# Patient Record
Sex: MALE | Race: ASIAN | NOT HISPANIC OR LATINO | Employment: OTHER | URBAN - METROPOLITAN AREA
[De-identification: names, ages, dates, MRNs, and addresses within clinical notes are randomized per-mention and may not be internally consistent; named-entity substitution may affect disease eponyms.]

---

## 2021-10-17 ENCOUNTER — HOSPITAL ENCOUNTER (OUTPATIENT)
Facility: MEDICAL CENTER | Age: 66
End: 2021-10-18
Attending: EMERGENCY MEDICINE | Admitting: STUDENT IN AN ORGANIZED HEALTH CARE EDUCATION/TRAINING PROGRAM
Payer: COMMERCIAL

## 2021-10-17 ENCOUNTER — APPOINTMENT (OUTPATIENT)
Dept: RADIOLOGY | Facility: MEDICAL CENTER | Age: 66
End: 2021-10-17
Attending: EMERGENCY MEDICINE
Payer: COMMERCIAL

## 2021-10-17 DIAGNOSIS — K92.2 GASTROINTESTINAL HEMORRHAGE, UNSPECIFIED GASTROINTESTINAL HEMORRHAGE TYPE: ICD-10-CM

## 2021-10-17 DIAGNOSIS — D62 ACUTE BLOOD LOSS ANEMIA: ICD-10-CM

## 2021-10-17 PROBLEM — E11.9 TYPE 2 DIABETES MELLITUS, WITHOUT LONG-TERM CURRENT USE OF INSULIN (HCC): Status: ACTIVE | Noted: 2021-10-17

## 2021-10-17 PROBLEM — I10 HYPERTENSION: Status: ACTIVE | Noted: 2021-10-17

## 2021-10-17 LAB
ABO + RH BLD: NORMAL
ABO GROUP BLD: NORMAL
ALBUMIN SERPL BCP-MCNC: 4.3 G/DL (ref 3.2–4.9)
ALBUMIN/GLOB SERPL: 1.4 G/DL
ALP SERPL-CCNC: 56 U/L (ref 30–99)
ALT SERPL-CCNC: 24 U/L (ref 2–50)
ANION GAP SERPL CALC-SCNC: 13 MMOL/L (ref 7–16)
APTT PPP: 32.9 SEC (ref 24.7–36)
AST SERPL-CCNC: 16 U/L (ref 12–45)
BASOPHILS # BLD AUTO: 0.7 % (ref 0–1.8)
BASOPHILS # BLD: 0.05 K/UL (ref 0–0.12)
BILIRUB SERPL-MCNC: 0.2 MG/DL (ref 0.1–1.5)
BLD GP AB SCN SERPL QL: NORMAL
BUN SERPL-MCNC: 25 MG/DL (ref 8–22)
CALCIUM SERPL-MCNC: 9.6 MG/DL (ref 8.5–10.5)
CHLORIDE SERPL-SCNC: 101 MMOL/L (ref 96–112)
CO2 SERPL-SCNC: 27 MMOL/L (ref 20–33)
CREAT SERPL-MCNC: 1.03 MG/DL (ref 0.5–1.4)
EOSINOPHIL # BLD AUTO: 0.22 K/UL (ref 0–0.51)
EOSINOPHIL NFR BLD: 3.3 % (ref 0–6.9)
ERYTHROCYTE [DISTWIDTH] IN BLOOD BY AUTOMATED COUNT: 42.7 FL (ref 35.9–50)
EST. AVERAGE GLUCOSE BLD GHB EST-MCNC: 177 MG/DL
GLOBULIN SER CALC-MCNC: 3.1 G/DL (ref 1.9–3.5)
GLUCOSE SERPL-MCNC: 241 MG/DL (ref 65–99)
HBA1C MFR BLD: 7.8 % (ref 4–5.6)
HCT VFR BLD AUTO: 31.6 % (ref 42–52)
HCT VFR BLD AUTO: 43.2 % (ref 42–52)
HGB BLD-MCNC: 10.3 G/DL (ref 14–18)
HGB BLD-MCNC: 12 G/DL (ref 14–18)
HGB BLD-MCNC: 14.1 G/DL (ref 14–18)
IMM GRANULOCYTES # BLD AUTO: 0.01 K/UL (ref 0–0.11)
IMM GRANULOCYTES NFR BLD AUTO: 0.1 % (ref 0–0.9)
INR PPP: 0.99 (ref 0.87–1.13)
LIPASE SERPL-CCNC: 43 U/L (ref 11–82)
LYMPHOCYTES # BLD AUTO: 2.11 K/UL (ref 1–4.8)
LYMPHOCYTES NFR BLD: 31.6 % (ref 22–41)
MCH RBC QN AUTO: 31 PG (ref 27–33)
MCHC RBC AUTO-ENTMCNC: 32.6 G/DL (ref 33.7–35.3)
MCV RBC AUTO: 94.9 FL (ref 81.4–97.8)
MONOCYTES # BLD AUTO: 0.58 K/UL (ref 0–0.85)
MONOCYTES NFR BLD AUTO: 8.7 % (ref 0–13.4)
NEUTROPHILS # BLD AUTO: 3.7 K/UL (ref 1.82–7.42)
NEUTROPHILS NFR BLD: 55.6 % (ref 44–72)
NRBC # BLD AUTO: 0 K/UL
NRBC BLD-RTO: 0 /100 WBC
PLATELET # BLD AUTO: 231 K/UL (ref 164–446)
PMV BLD AUTO: 9.7 FL (ref 9–12.9)
POTASSIUM SERPL-SCNC: 3.7 MMOL/L (ref 3.6–5.5)
PROT SERPL-MCNC: 7.4 G/DL (ref 6–8.2)
PROTHROMBIN TIME: 12.8 SEC (ref 12–14.6)
RBC # BLD AUTO: 4.55 M/UL (ref 4.7–6.1)
RH BLD: NORMAL
SODIUM SERPL-SCNC: 141 MMOL/L (ref 135–145)
WBC # BLD AUTO: 6.7 K/UL (ref 4.8–10.8)

## 2021-10-17 PROCEDURE — A9270 NON-COVERED ITEM OR SERVICE: HCPCS | Performed by: STUDENT IN AN ORGANIZED HEALTH CARE EDUCATION/TRAINING PROGRAM

## 2021-10-17 PROCEDURE — 700102 HCHG RX REV CODE 250 W/ 637 OVERRIDE(OP): Performed by: STUDENT IN AN ORGANIZED HEALTH CARE EDUCATION/TRAINING PROGRAM

## 2021-10-17 PROCEDURE — 85730 THROMBOPLASTIN TIME PARTIAL: CPT

## 2021-10-17 PROCEDURE — 96366 THER/PROPH/DIAG IV INF ADDON: CPT

## 2021-10-17 PROCEDURE — G0378 HOSPITAL OBSERVATION PER HR: HCPCS

## 2021-10-17 PROCEDURE — 96375 TX/PRO/DX INJ NEW DRUG ADDON: CPT

## 2021-10-17 PROCEDURE — 700111 HCHG RX REV CODE 636 W/ 250 OVERRIDE (IP): Performed by: STUDENT IN AN ORGANIZED HEALTH CARE EDUCATION/TRAINING PROGRAM

## 2021-10-17 PROCEDURE — 36415 COLL VENOUS BLD VENIPUNCTURE: CPT

## 2021-10-17 PROCEDURE — 80053 COMPREHEN METABOLIC PANEL: CPT

## 2021-10-17 PROCEDURE — C9113 INJ PANTOPRAZOLE SODIUM, VIA: HCPCS | Performed by: STUDENT IN AN ORGANIZED HEALTH CARE EDUCATION/TRAINING PROGRAM

## 2021-10-17 PROCEDURE — 85014 HEMATOCRIT: CPT

## 2021-10-17 PROCEDURE — 99219 PR INITIAL OBSERVATION CARE,LEVL II: CPT | Performed by: STUDENT IN AN ORGANIZED HEALTH CARE EDUCATION/TRAINING PROGRAM

## 2021-10-17 PROCEDURE — C9113 INJ PANTOPRAZOLE SODIUM, VIA: HCPCS | Performed by: EMERGENCY MEDICINE

## 2021-10-17 PROCEDURE — 700105 HCHG RX REV CODE 258: Performed by: EMERGENCY MEDICINE

## 2021-10-17 PROCEDURE — 86900 BLOOD TYPING SEROLOGIC ABO: CPT

## 2021-10-17 PROCEDURE — 86850 RBC ANTIBODY SCREEN: CPT

## 2021-10-17 PROCEDURE — 85018 HEMOGLOBIN: CPT

## 2021-10-17 PROCEDURE — 96374 THER/PROPH/DIAG INJ IV PUSH: CPT

## 2021-10-17 PROCEDURE — 85610 PROTHROMBIN TIME: CPT

## 2021-10-17 PROCEDURE — 700111 HCHG RX REV CODE 636 W/ 250 OVERRIDE (IP): Performed by: EMERGENCY MEDICINE

## 2021-10-17 PROCEDURE — 85025 COMPLETE CBC W/AUTO DIFF WBC: CPT

## 2021-10-17 PROCEDURE — 71045 X-RAY EXAM CHEST 1 VIEW: CPT

## 2021-10-17 PROCEDURE — 96365 THER/PROPH/DIAG IV INF INIT: CPT

## 2021-10-17 PROCEDURE — 83690 ASSAY OF LIPASE: CPT

## 2021-10-17 PROCEDURE — 86901 BLOOD TYPING SEROLOGIC RH(D): CPT

## 2021-10-17 PROCEDURE — 83036 HEMOGLOBIN GLYCOSYLATED A1C: CPT

## 2021-10-17 PROCEDURE — 99285 EMERGENCY DEPT VISIT HI MDM: CPT

## 2021-10-17 RX ORDER — DEXTROSE MONOHYDRATE 25 G/50ML
50 INJECTION, SOLUTION INTRAVENOUS
Status: DISCONTINUED | OUTPATIENT
Start: 2021-10-17 | End: 2021-10-18 | Stop reason: HOSPADM

## 2021-10-17 RX ORDER — ATORVASTATIN CALCIUM 20 MG/1
20 TABLET, FILM COATED ORAL DAILY
COMMUNITY
Start: 2021-04-02 | End: 2023-04-02

## 2021-10-17 RX ORDER — ONDANSETRON 2 MG/ML
4 INJECTION INTRAMUSCULAR; INTRAVENOUS EVERY 4 HOURS PRN
Status: DISCONTINUED | OUTPATIENT
Start: 2021-10-17 | End: 2021-10-18 | Stop reason: HOSPADM

## 2021-10-17 RX ORDER — HYDRALAZINE HYDROCHLORIDE 20 MG/ML
10 INJECTION INTRAMUSCULAR; INTRAVENOUS EVERY 6 HOURS PRN
Status: DISCONTINUED | OUTPATIENT
Start: 2021-10-17 | End: 2021-10-17

## 2021-10-17 RX ORDER — HYDROCHLOROTHIAZIDE 25 MG/1
25 TABLET ORAL DAILY
COMMUNITY
Start: 2021-09-02

## 2021-10-17 RX ORDER — LISINOPRIL 40 MG/1
40 TABLET ORAL DAILY
COMMUNITY
Start: 2019-09-23

## 2021-10-17 RX ORDER — OMEPRAZOLE 20 MG/1
20 CAPSULE, DELAYED RELEASE ORAL DAILY
Status: DISCONTINUED | OUTPATIENT
Start: 2021-10-17 | End: 2021-10-18 | Stop reason: HOSPADM

## 2021-10-17 RX ORDER — PANTOPRAZOLE SODIUM 40 MG/10ML
40 INJECTION, POWDER, LYOPHILIZED, FOR SOLUTION INTRAVENOUS 2 TIMES DAILY
Status: DISCONTINUED | OUTPATIENT
Start: 2021-10-17 | End: 2021-10-17

## 2021-10-17 RX ORDER — METFORMIN HYDROCHLORIDE 500 MG/1
2 TABLET, EXTENDED RELEASE ORAL 2 TIMES DAILY
COMMUNITY
Start: 2020-05-26

## 2021-10-17 RX ORDER — AMLODIPINE BESYLATE 5 MG/1
5 TABLET ORAL 2 TIMES DAILY
COMMUNITY
Start: 2021-08-19

## 2021-10-17 RX ORDER — GLIPIZIDE 5 MG/1
5-10 TABLET ORAL 2 TIMES DAILY
COMMUNITY
Start: 2020-12-21

## 2021-10-17 RX ADMIN — PANTOPRAZOLE SODIUM 40 MG: 40 INJECTION, POWDER, LYOPHILIZED, FOR SOLUTION INTRAVENOUS at 06:15

## 2021-10-17 RX ADMIN — OMEPRAZOLE 20 MG: 20 CAPSULE, DELAYED RELEASE ORAL at 10:49

## 2021-10-17 RX ADMIN — PANTOPRAZOLE SODIUM 80 MG: 40 INJECTION, POWDER, FOR SOLUTION INTRAVENOUS at 06:00

## 2021-10-17 ASSESSMENT — ENCOUNTER SYMPTOMS
DIZZINESS: 0
TINGLING: 0
SINUS PAIN: 0
ABDOMINAL PAIN: 0
DEPRESSION: 0
NECK PAIN: 0
BLOOD IN STOOL: 1
NERVOUS/ANXIOUS: 0
VOMITING: 0
BACK PAIN: 0
MYALGIAS: 0
CONSTIPATION: 0
PALPITATIONS: 0
FLANK PAIN: 0
NAUSEA: 0
CHILLS: 0
ORTHOPNEA: 0
COUGH: 0
DOUBLE VISION: 0
HEADACHES: 0
SHORTNESS OF BREATH: 0
DIARRHEA: 1
BRUISES/BLEEDS EASILY: 0
HEARTBURN: 0
PHOTOPHOBIA: 0
FEVER: 0
LOSS OF CONSCIOUSNESS: 0
BLURRED VISION: 0
HEMOPTYSIS: 0
EYE PAIN: 0
SORE THROAT: 0

## 2021-10-17 ASSESSMENT — COGNITIVE AND FUNCTIONAL STATUS - GENERAL
SUGGESTED CMS G CODE MODIFIER MOBILITY: CH
SUGGESTED CMS G CODE MODIFIER DAILY ACTIVITY: CH
MOBILITY SCORE: 24
DAILY ACTIVITIY SCORE: 24

## 2021-10-17 ASSESSMENT — LIFESTYLE VARIABLES
CONSUMPTION TOTAL: POSITIVE
ALCOHOL_USE: YES
EVER HAD A DRINK FIRST THING IN THE MORNING TO STEADY YOUR NERVES TO GET RID OF A HANGOVER: NO
DOES PATIENT WANT TO STOP DRINKING: YES
TOTAL SCORE: 2
ON A TYPICAL DAY WHEN YOU DRINK ALCOHOL HOW MANY DRINKS DO YOU HAVE: 2
SUBSTANCE_ABUSE: 0
DOES PATIENT WANT TO TALK TO SOMEONE ABOUT QUITTING: NO
TOTAL SCORE: 2
EVER FELT BAD OR GUILTY ABOUT YOUR DRINKING: YES
AVERAGE NUMBER OF DAYS PER WEEK YOU HAVE A DRINK CONTAINING ALCOHOL: 7
TOTAL SCORE: 2
HAVE PEOPLE ANNOYED YOU BY CRITICIZING YOUR DRINKING: NO
HOW MANY TIMES IN THE PAST YEAR HAVE YOU HAD 5 OR MORE DRINKS IN A DAY: 0
HAVE YOU EVER FELT YOU SHOULD CUT DOWN ON YOUR DRINKING: YES

## 2021-10-17 ASSESSMENT — PATIENT HEALTH QUESTIONNAIRE - PHQ9
SUM OF ALL RESPONSES TO PHQ9 QUESTIONS 1 AND 2: 0
1. LITTLE INTEREST OR PLEASURE IN DOING THINGS: NOT AT ALL
2. FEELING DOWN, DEPRESSED, IRRITABLE, OR HOPELESS: NOT AT ALL

## 2021-10-17 NOTE — ASSESSMENT & PLAN NOTE
Holding antihypertensives in setting of ABLA from lower GIB  Restart as outpatient if hemodynamically stable  No indication for strict BP control, discontinued PRN antihypertensives

## 2021-10-17 NOTE — CARE PLAN
Problem: Knowledge Deficit - Standard  Goal: Patient and family/care givers will demonstrate understanding of plan of care, disease process/condition, diagnostic tests and medications  Outcome: Progressing     The patient is Stable - Low risk of patient condition declining or worsening    Shift Goals  Clinical Goals: Monitor H&H Q 8 hours    Progress made toward(s) clinical / shift goals:  Last hemoglobin was 12. Next lab draw is scheduled for 1500.

## 2021-10-17 NOTE — H&P
Hospital Medicine History & Physical Note    Date of Service  10/17/2021    Primary Care Physician  No primary care provider on file.    Consultants  none     Specialist Names: none     Code Status  Full Code    Chief Complaint  Chief Complaint   Patient presents with   • Bloody Stools     c/o bloody stool started around 1 am. described as bright red blood. no abdominal pain. denied N/V. states that he had Hx of bloody stool.        History of Presenting Illness  Edgar Bonds is a 66 y.o. male with past medical history of DM, HTN, prior GI bleed who presented 10/17/2021 with blood per rectum that started at 1 am today. He reports casimiro bright red blood with some clots He reports having 2 more episodes prior to EMS arrival. He denies any NSAID abuse. He does report having a gi bleed in 2020 and had a colonoscopy done in [place], [place]. He was advised to stop taking baby aspirin after this, however he recently re started 2 weeks ago. He denies any nausea or vomiting. He denies any chest pain, shortness of breath, chills, or fever. He is vaccinated for covid 19.     I discussed the plan of care with patient.    Review of Systems  Review of Systems   Constitutional: Negative for chills and fever.   HENT: Negative for hearing loss and tinnitus.    Eyes: Negative for blurred vision, double vision and photophobia.   Respiratory: Negative for cough and hemoptysis.    Cardiovascular: Negative for chest pain, palpitations and orthopnea.   Gastrointestinal: Positive for blood in stool. Negative for heartburn, nausea and vomiting.   Genitourinary: Negative for dysuria, frequency and urgency.   Musculoskeletal: Negative for back pain, myalgias and neck pain.   Skin: Negative for itching and rash.   Neurological: Negative for dizziness, tingling and headaches.   Endo/Heme/Allergies: Does not bruise/bleed easily.   Psychiatric/Behavioral: Negative for depression, substance abuse and suicidal ideas.       Past Medical  History   has a past medical history of Diabetes (HCC) and Hypertension.    Surgical History  Reviewed and not pertinent     Family History  Reviewed and not pertinent     Family history reviewed with patient. There is no family history that is pertinent to the chief complaint.     Social History   reports that he has never smoked. He has never used smokeless tobacco. He reports current alcohol use. He reports that he does not use drugs.    Allergies  No Known Allergies    Medications  None       Physical Exam  Temp:  [36.2 °C (97.2 °F)] 36.2 °C (97.2 °F)  Pulse:  [91] 91  Resp:  [18] 18  BP: (149)/(78) 149/78  SpO2:  [95 %] 95 %  Blood Pressure : 149/78   Temperature: 36.2 °C (97.2 °F)   Pulse: 91   Respiration: 18   Pulse Oximetry: 95 %       Physical Exam  Vitals and nursing note reviewed.   Constitutional:       General: He is not in acute distress.     Appearance: Normal appearance. He is not ill-appearing.   HENT:      Head: Normocephalic and atraumatic.      Right Ear: Tympanic membrane normal.      Left Ear: Tympanic membrane normal.      Nose: Nose normal.      Mouth/Throat:      Mouth: Mucous membranes are moist.      Pharynx: Oropharynx is clear.   Eyes:      General:         Right eye: No discharge.         Left eye: No discharge.      Extraocular Movements: Extraocular movements intact.      Pupils: Pupils are equal, round, and reactive to light.   Cardiovascular:      Rate and Rhythm: Normal rate and regular rhythm.      Pulses: Normal pulses.      Heart sounds: Normal heart sounds. No murmur heard.   No friction rub.   Pulmonary:      Effort: Pulmonary effort is normal. No respiratory distress.      Breath sounds: Normal breath sounds. No stridor. No wheezing or rhonchi.   Abdominal:      General: Bowel sounds are normal. There is no distension.      Palpations: Abdomen is soft. There is no mass.      Tenderness: There is no abdominal tenderness.      Hernia: No hernia is present.   Musculoskeletal:          General: No swelling, tenderness, deformity or signs of injury. Normal range of motion.      Cervical back: Neck supple. No rigidity or tenderness.   Skin:     General: Skin is warm.      Capillary Refill: Capillary refill takes less than 2 seconds.      Coloration: Skin is not jaundiced or pale.      Findings: No bruising or erythema.   Neurological:      General: No focal deficit present.      Mental Status: He is alert and oriented to person, place, and time. Mental status is at baseline.      Cranial Nerves: No cranial nerve deficit.      Sensory: No sensory deficit.      Motor: No weakness.      Coordination: Coordination normal.   Psychiatric:         Mood and Affect: Mood normal.         Behavior: Behavior normal.         Laboratory:  Recent Labs     10/17/21  0242   WBC 6.7   RBC 4.55*   HEMOGLOBIN 14.1   HEMATOCRIT 43.2   MCV 94.9   MCH 31.0   MCHC 32.6*   RDW 42.7   PLATELETCT 231   MPV 9.7     Recent Labs     10/17/21  0242   SODIUM 141   POTASSIUM 3.7   CHLORIDE 101   CO2 27   GLUCOSE 241*   BUN 25*   CREATININE 1.03   CALCIUM 9.6     Recent Labs     10/17/21  0242   ALTSGPT 24   ASTSGOT 16   ALKPHOSPHAT 56   TBILIRUBIN 0.2   LIPASE 43   GLUCOSE 241*     Recent Labs     10/17/21  0242   APTT 32.9   INR 0.99       Imaging:  DX-CHEST-PORTABLE (1 VIEW)   Final Result      1.  No acute cardiopulmonary abnormality.   2.  Mild cardiac silhouette enlargement.            X-Ray:  I have personally reviewed the images and compared with prior images.    Assessment/Plan:  I anticipate this patient is appropriate for observation status at this time.    * GI bleed  Assessment & Plan  NPO   Serial abdominal exams   IV PPI BID   Monitor H/H  COD  Transfuse < 7   Gi consultation in am - patient is hemodynamically stable at ths time    Hypertension  Assessment & Plan  IV hydralazine 10 mg every 6 hours prn       Type 2 diabetes mellitus, without long-term current use of insulin (HCC)  Assessment & Plan  ISS+frequent  accu-checks+hypoglycemia protocol       VTE prophylaxis: pharmacologic prophylaxis contraindicated due to gi bleed

## 2021-10-17 NOTE — ED TRIAGE NOTES
Edgar Bonds  66 y.o.  male  Chief Complaint   Patient presents with   • Bloody Stools     c/o bloody stool started around 1 am. described as bright red blood. no abdominal pain. denied N/V. states that he had Hx of bloody stool.

## 2021-10-17 NOTE — ED NOTES
Report received, assuming care.  Pt ambulatory to the restroom and back.  Steady gait.  Reports bright red blood per rectum every hour.  Reports mild abdominal discomfort.  BP stable.  When pt falls asleep sats decreased to 80%.  Reports hx of sleep apnea.  Placed on O2 2l via NC.

## 2021-10-17 NOTE — ASSESSMENT & PLAN NOTE
Improving today, stool is becoming more brown and formed  Recurrent LGIB, previously 9/2014 had diverticular bleed (colonoscopy report in Care Everywhere)  Brisk painless BRBPR likely diverticular  GI consult deferred in absence of hemodynamic instability and with self-limiting BRBPR  Presented with normal Hgb but now developed ABLA  T&S completed  Consult GI if ongoing BRBPR and ABLA tomorrow  Follow up with [place] [facility] after discharge  IV protonix transitioned to PO omeprazole (limited efficacy for lower GIB)

## 2021-10-17 NOTE — PROGRESS NOTES
Received report from ER nurse, Sierra.  Patient arrived to floor at 1345.  Assessment complete and POC discussed.   Patient is A&Ox4, VSS, on RA.   Patient denies pain, no apparent signs of distress or discomfort.   Patient is on a clear liquid diet; gave patient water and broth.  Admit profile and 4 eyes skin assessment is complete.  Bed is in lowest/locked position.   Call light and belongings are within reach.   No further needs at this time.

## 2021-10-17 NOTE — PROGRESS NOTES
4 Eyes Skin Assessment Completed by Monik, AYDEE and AYDEE Johnson.    Head WDL  Ears WDL  Nose WDL  Mouth WDL  Neck WDL  Breast/Chest WDL  Shoulder Blades WDL  Spine WDL  (R) Arm/Elbow/Hand WDL  (L) Arm/Elbow/Hand WDL  Abdomen WDL  Groin WDL  Scrotum/Coccyx/Buttocks WDL  (R) Leg WDL  (L) Leg WDL  (R) Heel/Foot/Toe WDL, Dry  (L) Heel/Foot/Toe WDL, Dry          Devices In Places N/A      Interventions In Place N/A    Possible Skin Injury No    Pictures Uploaded Into Epic N/A  Wound Consult Placed N/A  RN Wound Prevention Protocol Ordered No

## 2021-10-17 NOTE — ASSESSMENT & PLAN NOTE
A1c 7.8  Metformin and glipizide held on admission  No indication for strict BG control, discontinued SSI + POCT

## 2021-10-17 NOTE — ED PROVIDER NOTES
"ED Provider Note    CHIEF COMPLAINT  Chief Complaint   Patient presents with   • Bloody Stools     c/o bloody stool started around 1 am. described as bright red blood. no abdominal pain. denied N/V. states that he had Hx of bloody stool.        HPI  Edgar Bonds is a 66 y.o. male who presents to the emergency room with bloody stool.  Patient has a history of GI bleeding evaluated in [place] about a year ago.  He has been fine since his hospital admission for this.  Reports he had a normal colonoscopy and because of bleeding was never identified.  He was hospitalized for 6 days.  At about 1 AM he developed a bright red bloody bowel movement.  Felt a bit bloated.  He has had 2 separate episodes including an episode of bloody stool here in the ER.  He denies any fevers or significant abdominal pain, again has been bloated.  He denies NSAIDs, but just started taking aspirin again about 2 weeks ago.  He takes 81 mg nightly.  No other easy bruising or bleeding.  No dizziness or lightheadedness.    REVIEW OF SYSTEMS  As per HPI, otherwise a 10 point review of systems is negative    PAST MEDICAL HISTORY  Past Medical History:   Diagnosis Date   • Diabetes (HCC)    • Hypertension        Family history  No pertinent family medical history    SOCIAL HISTORY  Social History     Tobacco Use   • Smoking status: Never Smoker   • Smokeless tobacco: Never Used   Vaping Use   • Vaping Use: Never used   Substance Use Topics   • Alcohol use: Yes     Comment: daily   • Drug use: Never       SURGICAL HISTORY  History reviewed. No pertinent surgical history.    CURRENT MEDICATIONS  Home Medications    **Home medications have not yet been reviewed for this encounter**         ALLERGIES  No Known Allergies    PHYSICAL EXAM  VITAL SIGNS: /78   Pulse 91   Temp 36.2 °C (97.2 °F) (Temporal)   Resp 18   Ht 1.676 m (5' 6\")   Wt 102 kg (225 lb 12 oz)   SpO2 95%   BMI 36.44 kg/m²    Constitutional: Awake and alert  HENT: " Normal inspection  Eyes: Normal inspection  Neck: Grossly normal range of motion.  Cardiovascular: Normal heart rate, Normal rhythm.  Symmetric peripheral pulses.   Thorax & Lungs: No respiratory distress, No wheezing, No rales, No rhonchi, No chest tenderness.   Abdomen: Bowel sounds normal, soft, non-distended, nontender, no mass  rectal exam: Skin tags from previous hemorrhoids.  No active anal bleeding or thrombosed hemorrhoid.  There is maroonish colored blood on SVITLANA  Skin: No obvious rash.  Extremities: No clubbing, cyanosis, edema, no Homans or cords.  Neurologic: Grossly normal   Psychiatric: Normal for situation    RADIOLOGY/PROCEDURES  DX-CHEST-PORTABLE (1 VIEW)   Final Result      1.  No acute cardiopulmonary abnormality.   2.  Mild cardiac silhouette enlargement.           Imaging is interpreted by radiologist    Labs:  Results for orders placed or performed during the hospital encounter of 10/17/21   COD (ADULT)   Result Value Ref Range    ABO Grouping Only B     Rh Grouping Only POS     Antibody Screen-Cod NEG    CBC WITH DIFFERENTIAL   Result Value Ref Range    WBC 6.7 4.8 - 10.8 K/uL    RBC 4.55 (L) 4.70 - 6.10 M/uL    Hemoglobin 14.1 14.0 - 18.0 g/dL    Hematocrit 43.2 42.0 - 52.0 %    MCV 94.9 81.4 - 97.8 fL    MCH 31.0 27.0 - 33.0 pg    MCHC 32.6 (L) 33.7 - 35.3 g/dL    RDW 42.7 35.9 - 50.0 fL    Platelet Count 231 164 - 446 K/uL    MPV 9.7 9.0 - 12.9 fL    Neutrophils-Polys 55.60 44.00 - 72.00 %    Lymphocytes 31.60 22.00 - 41.00 %    Monocytes 8.70 0.00 - 13.40 %    Eosinophils 3.30 0.00 - 6.90 %    Basophils 0.70 0.00 - 1.80 %    Immature Granulocytes 0.10 0.00 - 0.90 %    Nucleated RBC 0.00 /100 WBC    Neutrophils (Absolute) 3.70 1.82 - 7.42 K/uL    Lymphs (Absolute) 2.11 1.00 - 4.80 K/uL    Monos (Absolute) 0.58 0.00 - 0.85 K/uL    Eos (Absolute) 0.22 0.00 - 0.51 K/uL    Baso (Absolute) 0.05 0.00 - 0.12 K/uL    Immature Granulocytes (abs) 0.01 0.00 - 0.11 K/uL    NRBC (Absolute) 0.00 K/uL    COMP METABOLIC PANEL   Result Value Ref Range    Sodium 141 135 - 145 mmol/L    Potassium 3.7 3.6 - 5.5 mmol/L    Chloride 101 96 - 112 mmol/L    Co2 27 20 - 33 mmol/L    Anion Gap 13.0 7.0 - 16.0    Glucose 241 (H) 65 - 99 mg/dL    Bun 25 (H) 8 - 22 mg/dL    Creatinine 1.03 0.50 - 1.40 mg/dL    Calcium 9.6 8.5 - 10.5 mg/dL    AST(SGOT) 16 12 - 45 U/L    ALT(SGPT) 24 2 - 50 U/L    Alkaline Phosphatase 56 30 - 99 U/L    Total Bilirubin 0.2 0.1 - 1.5 mg/dL    Albumin 4.3 3.2 - 4.9 g/dL    Total Protein 7.4 6.0 - 8.2 g/dL    Globulin 3.1 1.9 - 3.5 g/dL    A-G Ratio 1.4 g/dL   LIPASE   Result Value Ref Range    Lipase 43 11 - 82 U/L   PROTHROMBIN TIME   Result Value Ref Range    PT 12.8 12.0 - 14.6 sec    INR 0.99 0.87 - 1.13   APTT   Result Value Ref Range    APTT 32.9 24.7 - 36.0 sec   ESTIMATED GFR   Result Value Ref Range    GFR If African American >60 >60 mL/min/1.73 m 2    GFR If Non African American >60 >60 mL/min/1.73 m 2       Medications   pantoprazole (PROTONIX) 80 mg in  mL IVPB (has no administration in time range)   pantoprazole (PROTONIX) injection 40 mg (has no administration in time range)       COURSE & MEDICAL DECISION MAKING  Patient presents to the ER with GI bleeding.  He has dark maroon blood on digital rectal exam.  His vital signs are stable.  Hemoglobin is within normal limits.  He does have an elevated BUN.  I have concern for possibility of upper bleeding as cause given he does report he had a previous negative colonoscopy.  I ordered 80 mg of Protonix IV.  No clinical suggestion of varices.  Patient will be admitted to the hospital.  GI will be consulted during regular hours.    FINAL IMPRESSION  1.  Gastrointestinal hemorrhage    This dictation was created using voice recognition software. The accuracy of the dictation is limited to the abilities of the software.  The nursing notes were reviewed and certain aspects of this information were incorporated into this  note.      Electronically signed by: Tigre Main M.D., 10/17/2021 6:07 AM

## 2021-10-17 NOTE — ED NOTES
Patient ambulated from [facility] to [facility] independently with steady gait accompanied by family.    Chart up for ERP

## 2021-10-18 VITALS
OXYGEN SATURATION: 93 % | HEART RATE: 81 BPM | HEIGHT: 66 IN | BODY MASS INDEX: 36.28 KG/M2 | RESPIRATION RATE: 18 BRPM | WEIGHT: 225.75 LBS | DIASTOLIC BLOOD PRESSURE: 68 MMHG | TEMPERATURE: 97.1 F | SYSTOLIC BLOOD PRESSURE: 123 MMHG

## 2021-10-18 LAB
HCT VFR BLD AUTO: 29.9 % (ref 42–52)
HCT VFR BLD AUTO: 30.3 % (ref 42–52)
HGB BLD-MCNC: 9.7 G/DL (ref 14–18)
HGB BLD-MCNC: 9.8 G/DL (ref 14–18)

## 2021-10-18 PROCEDURE — 85018 HEMOGLOBIN: CPT | Mod: 91

## 2021-10-18 PROCEDURE — A9270 NON-COVERED ITEM OR SERVICE: HCPCS | Performed by: STUDENT IN AN ORGANIZED HEALTH CARE EDUCATION/TRAINING PROGRAM

## 2021-10-18 PROCEDURE — 700102 HCHG RX REV CODE 250 W/ 637 OVERRIDE(OP): Performed by: STUDENT IN AN ORGANIZED HEALTH CARE EDUCATION/TRAINING PROGRAM

## 2021-10-18 PROCEDURE — 85014 HEMATOCRIT: CPT | Mod: 91

## 2021-10-18 PROCEDURE — 99217 PR OBSERVATION CARE DISCHARGE: CPT | Performed by: INTERNAL MEDICINE

## 2021-10-18 PROCEDURE — G0378 HOSPITAL OBSERVATION PER HR: HCPCS

## 2021-10-18 PROCEDURE — 36415 COLL VENOUS BLD VENIPUNCTURE: CPT

## 2021-10-18 RX ADMIN — OMEPRAZOLE 20 MG: 20 CAPSULE, DELAYED RELEASE ORAL at 04:42

## 2021-10-18 NOTE — DISCHARGE SUMMARY
Discharge Summary    CHIEF COMPLAINT ON ADMISSION  Chief Complaint   Patient presents with   • Bloody Stools     c/o bloody stool started around 1 am. described as bright red blood. no abdominal pain. denied N/V. states that he had Hx of bloody stool.        Reason for Admission  Blood in Stool     Admission Date  10/17/2021    CODE STATUS  Full Code    HPI & HOSPITAL COURSE  Edgar Bonds is a 66 y.o. male with past medical history of DM, HTN, prior GI bleed who presented 10/17/2021 with blood per rectum that started at 1 am 10/17. He reports casimiro bright red blood with some clots. He reports having 2 more episodes prior to EMS arrival. He denies any NSAID abuse. He does report having a gi bleed in 2020 and had a colonoscopy done in [place], [place]. He was advised to stop taking baby aspirin after this, however he recently re started 2 weeks ago. He denies abdominal pain, or rectal pain. Hb initially dropped from 14 to 10.3 but remained stable for the next 12 hours. Patient reported resolution of bright red blood per rectum. On day of discharge patient reports formed stool, brown in color, no blood. Patient tolerated diet without complication. Referrals placed for outpatient follow up with GI and establish with PCP. Patient was advised to return to hospital if BRBPR reoccurs. Patient was determined satisfactory for discharge with appropriate follow up.     Therefore, he is discharged in fair and stable condition to home with close outpatient follow-up.    The patient recovered much more quickly than anticipated on admission.    Discharge Date  10/18/2021    FOLLOW UP ITEMS POST DISCHARGE  Please follow up with PCP in 3-5 days for post hospitalization follow up and medication reconciliation.       DISCHARGE DIAGNOSES  Principal Problem:    Lower GI bleed POA: Yes  Active Problems:    Type 2 diabetes mellitus, without long-term current use of insulin (HCC) POA: Yes    Primary hypertension POA: Yes     Acute blood loss anemia POA: Yes  Resolved Problems:    * No resolved hospital problems. *      FOLLOW UP  No future appointments.  No follow-up provider specified.    MEDICATIONS ON DISCHARGE     Medication List      CONTINUE taking these medications      Instructions   amLODIPine 5 MG Tabs  Commonly known as: NORVASC   Take 5 mg by mouth 2 times a day.  Dose: 5 mg     atorvastatin 20 MG Tabs  Commonly known as: LIPITOR   Take 20 mg by mouth every day.  Dose: 20 mg     glipiZIDE 5 MG Tabs  Commonly known as: GLUCOTROL   Take 5-10 mg by mouth 2 times a day. 5AM & 10PM  Dose: 5-10 mg     hydroCHLOROthiazide 25 MG Tabs  Commonly known as: HYDRODIURIL   Take 25 mg by mouth every day.  Dose: 25 mg     lisinopril 40 MG tablet  Commonly known as: PRINIVIL   Take 40 mg by mouth every day.  Dose: 40 mg     metFORMIN  MG Tb24  Commonly known as: GLUCOPHAGE XR   Take 2 Tablets by mouth 2 times a day.  Dose: 2 Tablet            Allergies  No Known Allergies    DIET  Orders Placed This Encounter   Procedures   • Diet Order Diet: Low Fiber(GI Soft)     Standing Status:   Standing     Number of Occurrences:   1     Order Specific Question:   Diet:     Answer:   Low Fiber(GI Soft) [2]       ACTIVITY  As tolerated.  Weight bearing as tolerated    CONSULTATIONS  N/A    PROCEDURES  N/A    LABORATORY  Lab Results   Component Value Date    SODIUM 141 10/17/2021    POTASSIUM 3.7 10/17/2021    CHLORIDE 101 10/17/2021    CO2 27 10/17/2021    GLUCOSE 241 (H) 10/17/2021    BUN 25 (H) 10/17/2021    CREATININE 1.03 10/17/2021        Lab Results   Component Value Date    WBC 6.7 10/17/2021    HEMOGLOBIN 9.8 (L) 10/18/2021    HEMATOCRIT 29.9 (L) 10/18/2021    PLATELETCT 231 10/17/2021        Total time of the discharge process exceeds 41 minutes.

## 2021-10-18 NOTE — DISCHARGE INSTRUCTIONS
Discharge Instructions    Discharged to home by car with relative. Discharged via walking, hospital escort: Refused.  Special equipment needed: Not Applicable    Be sure to schedule a follow-up appointment with your primary care doctor or any specialists as instructed.     Discharge Plan:   Diet Plan: Discussed  Activity Level: Discussed  Confirmed Follow up Appointment: No (Comments)  Confirmed Symptoms Management: Discussed  Medication Reconciliation Updated: Yes  Influenza Vaccine Indication: Not indicated: Previously immunized this influenza season and > 8 years of age    I understand that a diet low in cholesterol, fat, and sodium is recommended for good health. Unless I have been given specific instructions below for another diet, I accept this instruction as my diet prescription.   Other diet: n/a    Special Instructions: None    · Is patient discharged on Warfarin / Coumadin?   No         Gastrointestinal Bleeding  Gastrointestinal (GI) bleeding is bleeding somewhere along the path that food travels through the body (digestive tract). This path is anywhere between the mouth and the opening of the butt (anus). You may have blood in your poop (stool) or have black poop. If you throw up (vomit), there may be blood in it.  This condition can be mild, serious, or even life-threatening. If you have a lot of bleeding, you may need to stay in the hospital.  What are the causes?  This condition may be caused by:  · Irritation and swelling of the esophagus (esophagitis). The esophagus is part of the body that moves food from your mouth to your stomach.  · Swollen veins in the butt (hemorrhoids).  · Areas of painful tearing in the opening of the butt (anal fissures). These are often caused by passing hard poop.  · Pouches that form on the colon over time (diverticulosis).  · Irritation and swelling (diverticulitis) in areas where pouches have formed on the colon.  · Growths (polyps) or cancer. Colon cancer often starts  out as growths that are not cancer.  · Irritation of the stomach lining (gastritis).  · Sores (ulcers) in the stomach.  What increases the risk?  You are more likely to develop this condition if you:  · Have a certain type of infection in your stomach (Helicobacter pylori infection).  · Take certain medicines.  · Smoke.  · Drink alcohol.  What are the signs or symptoms?  Common symptoms of this condition include:  · Throwing up (vomiting) material that has bright red blood in it. It may look like coffee grounds.  · Changes in your poop. The poop may:  ? Have red blood in it.  ? Be black, look like tar, and smell stronger than normal.  ? Be red.  · Pain or cramping in the belly (abdomen).  How is this treated?  Treatment for this condition depends on the cause of the bleeding. For example:  · Sometimes, the bleeding can be stopped during a procedure that is done to find the problem (endoscopy or colonoscopy).  · Medicines can be used to:  ? Help control irritation, swelling, or infection.  ? Reduce acid in your stomach.  · Certain problems can be treated with:  ? Creams.  ? Medicines that are put in the butt (suppositories).  ? Warm baths.  · Surgery is sometimes needed.  · If you lose a lot of blood, you may need a blood transfusion.  If bleeding is mild, you may be allowed to go home. If there is a lot of bleeding, you will need to stay in the hospital.  Follow these instructions at home:    · Take over-the-counter and prescription medicines only as told by your doctor.  · Eat foods that have a lot of fiber in them. These foods include beans, whole grains, and fresh fruits and vegetables. You can also try eating 1-3 prunes each day.  · Drink enough fluid to keep your pee (urine) pale yellow.  · Keep all follow-up visits as told by your doctor. This is important.  Contact a doctor if:  · Your symptoms do not get better.  Get help right away if:  · Your bleeding does not stop.  · You feel dizzy or you pass out  (faint).  · You feel weak.  · You have very bad cramps in your back or belly.  · You pass large clumps of blood (clots) in your poop.  · Your symptoms are getting worse.  · You have chest pain or fast heartbeats.  Summary  · GI bleeding is bleeding somewhere along the path that food travels through the body (digestive tract).  · This bleeding can be caused by many things. Treatment depends on the cause of the bleeding.  · Take medicines only as told by your doctor.  · Keep all follow-up visits as told by your doctor. This is important.  This information is not intended to replace advice given to you by your health care provider. Make sure you discuss any questions you have with your health care provider.  Document Released: 09/26/2009 Document Revised: 07/31/2019 Document Reviewed: 07/31/2019  [employer] Patient Education © 2020 [employer]            Depression / Suicide Risk    As you are discharged from this [facility], it is important to learn how to keep safe from harming yourself.    Recognize the warning signs:  · Abrupt changes in personality, positive or negative- including increase in energy   · Giving away possessions  · Change in eating patterns- significant weight changes-  positive or negative  · Change in sleeping patterns- unable to sleep or sleeping all the time   · Unwillingness or inability to communicate  · Depression  · Unusual sadness, discouragement and loneliness  · Talk of wanting to die  · Neglect of personal appearance   · Rebelliousness- reckless behavior  · Withdrawal from people/activities they love  · Confusion- inability to concentrate     If you or a loved one observes any of these behaviors or has concerns about self-harm, here's what you can do:  · Talk about it- your feelings and reasons for harming yourself  · Remove any means that you might use to hurt yourself (examples: pills, rope, extension cords, firearm)  · Get professional help from the community (Mental  Health, Substance Abuse, psychological counseling)  · Do not be alone:Call your Safe Contact- someone whom you trust who will be there for you.  · Call your local CRISIS HOTLINE 368-6698 or 140-483-2780  · Call your local Children's Mobile Crisis Response Team [place] (851) 925-0896 or www.Lagiar  · Call the toll free National Suicide Prevention Hotlines   · National Suicide Prevention Lifeline 116-536-SZOS (5843)  · National Hope Line Network 800-SUICIDE (714-8441)

## 2021-10-18 NOTE — CARE PLAN
The patient is Stable - Low risk of patient condition declining or worsening    Shift Goals  Clinical Goals: maintain Hgb >7  Patient Goals: to have formed stool    Progress made toward(s) clinical / shift goals:  pts hgb trending down however most recent >9    Patient is not progressing towards the following goals: pt continues to have loose stool

## 2021-10-18 NOTE — PROGRESS NOTES
*COVID-19 surge in effect*    Received bedside report at change of shift. Pt is resting in bed no signs of distress. VSS on RA. Discussed POC for the day and pt states no pain or other needs at this time. Bed is locked and in lowest position with water and call light in reach.

## 2021-10-18 NOTE — PROGRESS NOTES
Pt dc'd home with family. IV removed.  Pt left unit via walking with family. Personal belongings with pt when leaving unit. Pt given discharge instructions prior to leaving unit including where to  prescriptions and when to follow-up; verbalizes understanding. Copy of discharge instructions with pt and in the chart. UC notified at time of departure

## 2021-10-18 NOTE — DISCHARGE PLANNING
Anticipated Discharge Disposition: Home ([place], [place])    Action: Pt discussed during IDT rounds. Per Dr. Eckert pt to potentially d/c today. Pt has a six clicks score of 24.     LSW met with pt at bedside. Pt stated that his spouse or niece are available to provide transportation back home in [place], [place] upon d/c.     Barriers to Discharge: None     Plan: LSW to assist as needed       Care Transition Team Assessment    Information Source  Information Given By: Other (Comments)  Who is responsible for making decisions for patient? : Patient    Elopement Risk  Legal Hold: No  Ambulatory or Self Mobile in Wheelchair: Yes  Disoriented: No  Psychiatric Symptoms: None  History of Wandering: No  Elopement this Admit: No  Vocalizing Wanting to Leave: No  Displays Behaviors, Body Language Wanting to Leave: No-Not at Risk for Elopement  Elopement Risk: Not at Risk for Elopement    Interdisciplinary Discharge Planning  Lives with - Patient's Self Care Capacity: Spouse, Child Less than 18 Years of Age  Patient or legal guardian wants to designate a caregiver: No  Support Systems: Children, Friends / Neighbors, Family Member(s)  Housing / Facility: [address]  Durable Medical Equipment: Not Applicable    Discharge Preparedness  What is your plan after discharge?: Home with help  What are your discharge supports?: Spouse, Other (comment)  Prior Functional Level: Independent with Activities of Daily Living, Independent with Medication Management  Difficulity with ADLs: None  Difficulity with IADLs: None    Functional Assesment  Prior Functional Level: Independent with Activities of Daily Living, Independent with Medication Management    Finances  Financial Barriers to Discharge: No  Prescription Coverage: Yes    Vision / Hearing Impairment  Vision Impairment : Yes  Right Eye Vision: Impaired, Wears Glasses  Left Eye Vision: Impaired, Wears Glasses  Hearing Impairment : No    Domestic Abuse  Have you ever been the victim of  abuse or violence?: No  Physical Abuse or Sexual Abuse: No  Verbal Abuse or Emotional Abuse: No  Possible Abuse/Neglect Reported to:: Not Applicable    Psychological Assessment  History of Substance Abuse: None  History of Psychiatric Problems: No    Discharge Risks or Barriers  Discharge risks or barriers?: No  Patient risk factors: Vulnerable adult    Anticipated Discharge Information  Discharge Disposition: Discharged to home/self care (01)

## 2021-10-18 NOTE — PROGRESS NOTES
covid surge in effect    States his stools are decreasing with less blood; continue to monitor H/H q8hrs    Stated bm x1 early evening but nothing overnight

## 2021-10-18 NOTE — CARE PLAN
The patient is Stable - Low risk of patient condition declining or worsening    Shift Goals  Clinical Goals: maintain H/H every 8 hrs to mainatin >7    H/H this morning at 9.4 with 1 overnight bm noted; decreasing he states

## 2021-10-18 NOTE — PROGRESS NOTES
"Hospital Medicine Daily Progress Note    Date of Service  10/17/2021    Chief Complaint  Edgar Bonds is a 66 y.o. male with T2DM, HTN, and h/o diverticular bleed admitted 10/17/2021 with BRBPR.    Hospital Course  No notes on file    Interval Problem Update  He reports yesterday he developed BRB \"shooting\" out of his bottom.  He's had multiple loose red bloody BMs overnight and this morning.  He denies pain, N/V, F/C, presyncope, syncope.  He has a history of BRBPR evaluated through [facility] in 2014.  Colonoscopy report from September 2014 demonstrated diverticulosis and hemorrhoids.    He denies other bleeding, dyspnea, F/C, bladder dysfunction, dysphoria.    I re-evaluated him this afternoon. His BRBPR has decreased to a few drops in more-formed brown stool.    I have personally seen and examined the patient at bedside. I discussed the plan of care with patient and bedside RN.    Consultants/Specialty  None    Code Status  Full Code    Disposition  Patient is medically cleared pending stable Hgb and resolution of BRBPR.   Anticipate discharge to to home with close outpatient follow-up.  I have placed the appropriate orders for post-discharge needs.    Review of Systems  Review of Systems   Constitutional: Negative for chills and fever.   HENT: Negative for ear pain, nosebleeds, sinus pain and sore throat.    Eyes: Negative for pain.   Respiratory: Negative for hemoptysis and shortness of breath.    Cardiovascular: Negative for chest pain.   Gastrointestinal: Positive for blood in stool and diarrhea. Negative for abdominal pain, constipation, melena, nausea and vomiting.   Genitourinary: Negative for dysuria, flank pain and hematuria.   Musculoskeletal: Negative for back pain, joint pain, myalgias and neck pain.   Neurological: Negative for dizziness, loss of consciousness and headaches.   Endo/Heme/Allergies: Does not bruise/bleed easily.   Psychiatric/Behavioral: Negative for depression. The patient " is not nervous/anxious.         Physical Exam  Temp:  [36.2 °C (97.2 °F)-36.9 °C (98.4 °F)] 36.9 °C (98.4 °F)  Pulse:  [75-91] 83  Resp:  [16-23] 19  BP: (108-174)/(63-81) 108/72  SpO2:  [90 %-100 %] 90 %    Physical Exam  Vitals and nursing note reviewed.   HENT:      Head: Normocephalic.      Nose: Nose normal.      Mouth/Throat:      Mouth: Mucous membranes are moist.      Pharynx: Oropharynx is clear.   Eyes:      General: No scleral icterus.     Conjunctiva/sclera: Conjunctivae normal.   Cardiovascular:      Rate and Rhythm: Normal rate and regular rhythm.      Pulses: Normal pulses.      Heart sounds: Normal heart sounds. No murmur heard.   No friction rub. No gallop.    Pulmonary:      Effort: Pulmonary effort is normal. No respiratory distress.      Breath sounds: Normal breath sounds. No wheezing, rhonchi or rales.   Abdominal:      General: Abdomen is flat. Bowel sounds are normal. There is no distension.      Palpations: Abdomen is soft.      Tenderness: There is no abdominal tenderness. There is no guarding or rebound.   Genitourinary:     Comments: No mattson  Musculoskeletal:      Cervical back: Neck supple.      Right lower leg: No edema.      Left lower leg: No edema.   Skin:     General: Skin is warm and dry.   Neurological:      Mental Status: He is alert.      Comments: Appropriately conversant   Psychiatric:         Attention and Perception: Attention and perception normal.         Mood and Affect: Affect normal. Mood is anxious.         Speech: Speech normal.         Behavior: Behavior is cooperative.         Thought Content: Thought content normal.         Cognition and Memory: Cognition and memory normal.         Judgment: Judgment normal.         Fluids    Intake/Output Summary (Last 24 hours) at 10/17/2021 1831  Last data filed at 10/17/2021 1500  Gross per 24 hour   Intake 594 ml   Output --   Net 594 ml       Laboratory  Recent Labs     10/17/21  0242 10/17/21  0905 10/17/21  1656   WBC 6.7   --   --    RBC 4.55*  --   --    HEMOGLOBIN 14.1 12.0* 10.3*   HEMATOCRIT 43.2  --  31.6*   MCV 94.9  --   --    MCH 31.0  --   --    MCHC 32.6*  --   --    RDW 42.7  --   --    PLATELETCT 231  --   --    MPV 9.7  --   --      Recent Labs     10/17/21  0242   SODIUM 141   POTASSIUM 3.7   CHLORIDE 101   CO2 27   GLUCOSE 241*   BUN 25*   CREATININE 1.03   CALCIUM 9.6     Recent Labs     10/17/21  0242   APTT 32.9   INR 0.99               Imaging  DX-CHEST-PORTABLE (1 VIEW)   Final Result      1.  No acute cardiopulmonary abnormality.   2.  Mild cardiac silhouette enlargement.           Assessment/Plan  * Lower GI bleed- (present on admission)  Assessment & Plan  Improving today, stool is becoming more brown and formed  Recurrent LGIB, previously 9/2014 had diverticular bleed (colonoscopy report in Care Everywhere)  Brisk painless BRBPR likely diverticular  GI consult deferred in absence of hemodynamic instability and with self-limiting BRBPR  Presented with normal Hgb but now developed ABLA  T&S completed  Consult GI if ongoing BRBPR and ABLA tomorrow  Follow up with [facility] after discharge  IV protonix transitioned to PO omeprazole (limited efficacy for lower GIB)    Acute blood loss anemia- (present on admission)  Assessment & Plan  Due to lower GI bleed  Repeat Hgb q8h  Transfuse for Hgb <7 or hypotensive with abrupt bleeding    Type 2 diabetes mellitus, without long-term current use of insulin (HCC)- (present on admission)  Assessment & Plan  A1c 7.8  Metformin and glipizide held on admission  No indication for strict BG control, discontinued SSI + POCT      Primary hypertension- (present on admission)  Assessment & Plan  Holding antihypertensives in setting of ABLA from lower GIB  Restart as outpatient if hemodynamically stable  No indication for strict BP control, discontinued PRN antihypertensives       VTE prophylaxis: SCDs/TEDs and pharmacologic prophylaxis contraindicated due to lower GI  alice    I have performed a physical exam and reviewed and updated ROS and Plan today (10/17/2021). In review of yesterday's note (10/16/2021), there are no changes except as documented above.